# Patient Record
Sex: MALE | Race: WHITE | URBAN - METROPOLITAN AREA
[De-identification: names, ages, dates, MRNs, and addresses within clinical notes are randomized per-mention and may not be internally consistent; named-entity substitution may affect disease eponyms.]

---

## 2019-01-07 ENCOUNTER — ANCILLARY PROCEDURE (OUTPATIENT)
Dept: GENERAL RADIOLOGY | Facility: CLINIC | Age: 49
End: 2019-01-07
Payer: COMMERCIAL

## 2019-01-07 ENCOUNTER — OFFICE VISIT (OUTPATIENT)
Dept: ORTHOPEDICS | Facility: CLINIC | Age: 49
End: 2019-01-07
Payer: COMMERCIAL

## 2019-01-07 VITALS
BODY MASS INDEX: 30.8 KG/M2 | HEART RATE: 74 BPM | SYSTOLIC BLOOD PRESSURE: 137 MMHG | WEIGHT: 220 LBS | OXYGEN SATURATION: 97 % | HEIGHT: 71 IN | DIASTOLIC BLOOD PRESSURE: 87 MMHG

## 2019-01-07 DIAGNOSIS — M25.512 LEFT SHOULDER PAIN, UNSPECIFIED CHRONICITY: ICD-10-CM

## 2019-01-07 DIAGNOSIS — M54.2 NECK PAIN: ICD-10-CM

## 2019-01-07 DIAGNOSIS — M79.2 RADICULAR PAIN IN LEFT ARM: Primary | ICD-10-CM

## 2019-01-07 PROCEDURE — 73030 X-RAY EXAM OF SHOULDER: CPT | Mod: LT

## 2019-01-07 PROCEDURE — 99203 OFFICE O/P NEW LOW 30 MIN: CPT | Performed by: ORTHOPAEDIC SURGERY

## 2019-01-07 RX ORDER — CABERGOLINE 0.5 MG/1
0.5 TABLET ORAL
COMMUNITY
Start: 2018-10-23 | End: 2019-10-23

## 2019-01-07 SDOH — HEALTH STABILITY: MENTAL HEALTH: HOW OFTEN DO YOU HAVE A DRINK CONTAINING ALCOHOL?: MONTHLY OR LESS

## 2019-01-07 ASSESSMENT — MIFFLIN-ST. JEOR: SCORE: 1880.04

## 2019-01-07 NOTE — LETTER
1/7/2019         RE: Garcia Dickens  [id number] [facility] [id number]        Dear Colleague,    Thank you for referring your patient, Garcia Dickens, to the [facility]. Please see a copy of my visit note below.    SUBJECTIVE:  Garcia Dickens is a 48 year old male who is seen as a self referral for left shoulder injury that occurred that started/occurred  4 months ago.   Cause: was lifting heavy boxes at work at [employer], felt a pop in the left shoulder.  He had some soreness in the shoulder, but about 4 weeks ago noted pain from the neck into the lateral shoulder.  Also numbness/tingling lateral shoulder and down the arm past the elbow.    The pain has been worsening.    Treatment up to this point:ice, NSAIDS and Rest.  physical therapy has been ordered by [facility].  Prednisone helped some.  Prior history of related problems: history of previous corticosteroid injection left shoulder 5 years ago, but pain was different.  Significant Orthopedic past medical history:     No past medical history on file.    No past surgical history on file.    REVIEW OF SYSTEMS:  CONSTITUTIONAL:  NEGATIVE for fever, chills, change in weight  INTEGUMENTARY/SKIN:  NEGATIVE for worrisome rashes, moles or lesions  EYES:  NEGATIVE for vision changes or irritation  ENT/MOUTH:  NEGATIVE for ear, mouth and throat problems  RESP:  NEGATIVE for significant cough or SOB  BREAST:  NEGATIVE for masses, tenderness or discharge  CV:  NEGATIVE for chest pain, palpitations or peripheral edema  GI:  NEGATIVE for nausea, abdominal pain, heartburn, or change in bowel habits  :  Negative   MUSCULOSKELETAL:  See HPI above  NEURO:  NEGATIVE for weakness, dizziness or paresthesias  ENDOCRINE:  NEGATIVE for temperature intolerance, skin/hair changes  HEME/ALLERGY/IMMUNE:  NEGATIVE for bleeding problems  PSYCHIATRIC:  NEGATIVE for changes in mood or affect    OBJECTIVE:  /87 (BP Location: Right arm, Patient Position: Sitting, Cuff Size:  "Adult Regular)   Pulse 74   Ht 1.803 m (5' 11\")   Wt 99.8 kg (220 lb)   SpO2 97%   BMI 30.68 kg/m        GENERAL: healthy, alert and no distress  GAIT: normal   SKIN: no suspicious lesions or rashes  NEURO: Normal strength and tone, mentation intact and speech normal  VASCULAR:  normal pulses and cappillary refill   PSYCH:  mentation appears normal and affect normal/bright  RESP: No increased work of breathing  LYMPH:  No lymphedema    MUSCULOSKELETAL:    NECK:  Cervical range of motion: limited to the left,  causes pain into shoulder, reproduces shoulder pain  Sensory deficits:  Deltoid, lateral arm, lateral forearm and dorsum of thumb  Motor deficits:  5-/5 wrist dorsiflexion  DTR's:  decreased throughout bilateral upper extremities, symmetrical.    SHOULDER:  Shoulder Inspection: scapular winging: mild on left  Tender: trapezius, levator scapula    Range of Motion:   Active:all normal   Passive: all normal  Impingement: all grade negative  Strength: forward flexion 5/5, External rotation 5/5      Special tests: Sulcus: 0  Speed: Negative  Anterior Drawer: 0  Posterior Drawer: 0  Spurling's: Positive    X-RAY INTERPRETATION  Essentially normal      MRI:    none    ASSESSMENT    ICD-10-CM    1. Radicular pain in left arm-- I believe that this is the cause of his pain, not shoulder pathology. M79.2 PHYSICAL THERAPY REFERRAL   2. Neck pain M54.2    3. Left shoulder pain, unspecified chronicity M25.512 XR Shoulder Left G/E 3 Views       PLAN:   physical therapy ordered-- outside referral at patient's request, for cervical radicular pain  Follow up with PCP for elevated BP  Return to clinic here as needed.      .KEYLA Reyes MD  Dept. Orthopedic Surgery  [facility]      Again, thank you for allowing me to participate in the care of your patient.        Sincerely,        Jong Reyes MD    "

## 2019-01-07 NOTE — NURSING NOTE
Patient to follow up with Primary Care provider regarding elevated blood pressure. Patient has calibrated home monitor and will check with his [facility] provider for elevated BP.  Harman COVINGTON